# Patient Record
Sex: FEMALE | Race: WHITE | NOT HISPANIC OR LATINO | Employment: FULL TIME | URBAN - METROPOLITAN AREA
[De-identification: names, ages, dates, MRNs, and addresses within clinical notes are randomized per-mention and may not be internally consistent; named-entity substitution may affect disease eponyms.]

---

## 2023-11-27 ENCOUNTER — OFFICE VISIT (OUTPATIENT)
Dept: OBSTETRICS AND GYNECOLOGY | Facility: CLINIC | Age: 63
End: 2023-11-27
Payer: COMMERCIAL

## 2023-11-27 VITALS
WEIGHT: 164 LBS | HEIGHT: 66 IN | DIASTOLIC BLOOD PRESSURE: 76 MMHG | BODY MASS INDEX: 26.36 KG/M2 | SYSTOLIC BLOOD PRESSURE: 127 MMHG

## 2023-11-27 DIAGNOSIS — N76.0 ACUTE VAGINITIS: ICD-10-CM

## 2023-11-27 DIAGNOSIS — Z01.419 ENCOUNTER FOR GYNECOLOGICAL EXAMINATION WITHOUT ABNORMAL FINDING: ICD-10-CM

## 2023-11-27 DIAGNOSIS — M81.0 AGE RELATED OSTEOPOROSIS, UNSPECIFIED PATHOLOGICAL FRACTURE PRESENCE: ICD-10-CM

## 2023-11-27 DIAGNOSIS — Z78.0 MENOPAUSE: Primary | ICD-10-CM

## 2023-11-27 PROCEDURE — 99396 PREV VISIT EST AGE 40-64: CPT | Performed by: OBSTETRICS & GYNECOLOGY

## 2023-11-27 PROCEDURE — 1036F TOBACCO NON-USER: CPT | Performed by: OBSTETRICS & GYNECOLOGY

## 2023-11-27 PROCEDURE — 88175 CYTOPATH C/V AUTO FLUID REDO: CPT

## 2023-11-27 PROCEDURE — 87624 HPV HI-RISK TYP POOLED RSLT: CPT

## 2023-11-27 RX ORDER — SUMATRIPTAN 50 MG/1
TABLET, FILM COATED ORAL
COMMUNITY
Start: 2021-12-07

## 2023-11-27 RX ORDER — GABAPENTIN 300 MG/1
300 CAPSULE ORAL 3 TIMES DAILY
COMMUNITY

## 2023-11-27 RX ORDER — IBUPROFEN 200 MG
TABLET ORAL
COMMUNITY
Start: 2014-09-10

## 2023-11-27 RX ORDER — ALENDRONATE SODIUM 70 MG/1
TABLET ORAL
COMMUNITY
Start: 2021-09-30 | End: 2023-11-27 | Stop reason: SDUPTHER

## 2023-11-27 RX ORDER — CHOLECALCIFEROL (VITAMIN D3) 50 MCG
2000 TABLET ORAL
COMMUNITY
Start: 2015-03-09

## 2023-11-27 RX ORDER — ESTRADIOL 0.1 MG/G
CREAM VAGINAL
COMMUNITY
Start: 2019-02-18

## 2023-11-27 RX ORDER — ALENDRONATE SODIUM 70 MG/1
70 TABLET ORAL
Qty: 12 TABLET | Refills: 3 | Status: SHIPPED | OUTPATIENT
Start: 2023-11-27

## 2023-11-27 RX ORDER — FLUCONAZOLE 150 MG/1
TABLET ORAL
COMMUNITY
End: 2024-02-08

## 2023-11-27 RX ORDER — RIBOFLAVIN (VITAMIN B2) 100 MG
200 TABLET ORAL 2 TIMES DAILY
COMMUNITY
Start: 2020-07-29

## 2023-11-27 RX ORDER — TRAMADOL HYDROCHLORIDE 50 MG/1
50 TABLET ORAL EVERY 8 HOURS PRN
COMMUNITY
Start: 2013-07-21 | End: 2024-02-23

## 2023-11-27 RX ORDER — FLUCONAZOLE 150 MG/1
150 TABLET ORAL ONCE
Qty: 2 TABLET | Refills: 1 | Status: SHIPPED | OUTPATIENT
Start: 2023-11-27 | End: 2023-11-27

## 2023-11-27 NOTE — PROGRESS NOTES
Subjective   Yaneli Rowe is a 63 y.o. female here for a routine exam. Current complaints: She has a history of left breast cancer.  She is current on her imaging with the breast specialist.    She uses estradiol cream for vaginal dryness.  She did does have intermittent itchiness that responds to Diflucan.    There is no postmenopausal bleeding or pelvic pain.  No dysuria, no change in bowel habits or vaginal discharge.    A bone density test in September 2021 showed osteoporosis, T score -2.5.  She is on Fosamax.  Personal health questionnaire reviewed: yes.     Gynecologic History  Patient's last menstrual period was 01/01/2010 (approximate).  Contraception: post menopausal status  Last Pap: 9/1/2020. Results were: normal  Last mammogram: 3/27/23. Results were: normal    Obstetric History  OB History   No obstetric history on file.       Objective   Constitutional: Alert and in no acute distress. Well developed, well nourished.   Head and Face: Head and face: Normal.    Eyes: Normal external exam - nonicteric sclera, extraocular movements intact (EOMI) and no ptosis.   Neck: No neck asymmetry. Supple. Thyroid not enlarged and there were no palpable thyroid nodules.    Pulmonary: No respiratory distress.   Chest: Breasts: Normal appearance, no nipple discharge and no skin changes. Palpation of breasts and axillae: No palpable mass and no axillary lymphadenopathy.   Abdomen: Soft nontender; no abdominal mass palpated. No organomegaly. No hernias.   Genitourinary: External genitalia: Normal. No inguinal lymphadenopathy. Bartholin's Urethral and Skenes Glands: Normal. Urethra: Normal.  Bladder: Normal on palpation. Vagina: Normal. Cervix: Normal.  Uterus: Normal.  Right Adnexa/parametria: Normal.  Left Adnexa/parametria: Normal.  Inspection of Perianal Area: Normal.   Musculoskeletal: No joint swelling seen, normal movements of all extremities.   Skin: Normal skin color and pigmentation, normal skin turgor, and no  rash.   Neurologic: Non-focal. Grossly intact.   Psychiatric: Alert and oriented x 3. Affect normal to patient baseline. Mood: Appropriate.  Physical Exam     Assessment/Plan   Healthy female exam.  This is a 63-year-old female with a normal exam.  A Pap smear was sent.  Her routine mammogram is completed with the breast specialist.    She plans to have her bone density test at the [facility] location.  A refill for Fosamax and Diflucan was ordered to the pharmacy.  I will see her in 1 year.  Education reviewed: self breast exams.

## 2023-12-11 ENCOUNTER — HOSPITAL ENCOUNTER (OUTPATIENT)
Dept: RADIOLOGY | Facility: HOSPITAL | Age: 63
Discharge: HOME | End: 2023-12-11
Payer: COMMERCIAL

## 2023-12-11 DIAGNOSIS — Z78.0 MENOPAUSE: ICD-10-CM

## 2023-12-11 PROCEDURE — 77080 DXA BONE DENSITY AXIAL: CPT | Performed by: STUDENT IN AN ORGANIZED HEALTH CARE EDUCATION/TRAINING PROGRAM

## 2023-12-11 PROCEDURE — 77080 DXA BONE DENSITY AXIAL: CPT

## 2023-12-12 NOTE — RESULT ENCOUNTER NOTE
The bone density test on December 11 shows normal bone density of the spine, and an improvement of the bone density of the left hip.  The T-score is now -2.4.  It appears the Fosamax is increasing your bone density in the left hip from the last study by 6.8%.  I recommend continuing the Fosamax.  Repeat bone density test is in 2 years.

## 2023-12-13 NOTE — RESULT ENCOUNTER NOTE
I left a message on [employer], as she is not active in [employer].  The bone density is improving on Fosamax.  She has osteopenia of the left hip and a normal spine bone density.  I recommend a repeat bone density test in 2 years.

## 2023-12-18 LAB
CYTOLOGY CMNT CVX/VAG CYTO-IMP: NORMAL
HPV HR 12 DNA GENITAL QL NAA+PROBE: NEGATIVE
HPV HR GENOTYPES PNL CVX NAA+PROBE: NEGATIVE
HPV16 DNA SPEC QL NAA+PROBE: NEGATIVE
HPV18 DNA SPEC QL NAA+PROBE: NEGATIVE
LAB AP HPV GENOTYPE QUESTION: YES
LAB AP HPV HR: NORMAL
LABORATORY COMMENT REPORT: NORMAL
LMP START DATE: NORMAL
MENSTRUAL HX REPORTED: NORMAL
PATH REPORT.TOTAL CANCER: NORMAL

## 2024-02-07 DIAGNOSIS — N76.0 ACUTE VAGINITIS: Primary | ICD-10-CM

## 2024-02-08 RX ORDER — FLUCONAZOLE 150 MG/1
TABLET ORAL
Qty: 3 TABLET | Refills: 1 | Status: SHIPPED | OUTPATIENT
Start: 2024-02-08 | End: 2024-02-11

## 2024-03-28 NOTE — PROGRESS NOTES
Yaneli Rowe female   1960 64 y.o.   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 She underwent left magseed partial mastectomy, magseed SLNB with intraoperative radiation and bilateral crescent mastopexy closure on 2019. Final pathology demonstrated three sentinel lymph nodes, negative for malignancy (0/3), left breast no residual carcinoma identified, ADH, ALH, greatest dimension 0.4cm ( from previous biopsy specimen) , grade 1, Negative margins. pT1a N0 MX. She took Tamoxifen 2019-2020 discontinued due to low platelets and unable to tolerate Raloxifene due to increase in migraines. She denies any new masses, skin changes, nipple discharge or pain.       BREAST IMAGING: 3/18/2022 bilateral diagnostic mammogram BI-RADS Category 2. No breast MRIs performed.      REPRODUCTIVE HISTORY: Menarche age 12, , first birth at 31, Menopause age 53, heterogenously breast tissue     FAMILY CANCER HISTORY:   Mother: colon cancer in 70's   Father: melanoma in 80's         REVIEW OF SYSTEMS    Constitutional:  Negative for appetite change, fatigue, fever and unexpected weight change.   HENT:  Negative for ear pain, hearing loss, nosebleeds, sore throat and trouble swallowing.    Eyes:  Negative for discharge, itching and visual disturbance.   Respiratory:  Negative for cough, chest tightness and shortness of breath.    Cardiovascular:  Negative for chest pain, palpitations and leg swelling.   Breast: as indicated in HPI  Gastrointestinal:  Negative for abdominal pain, constipation, diarrhea and nausea.   Endocrine: Negative for cold intolerance and heat intolerance.    Genitourinary:  Negative for dysuria, frequency, hematuria, pelvic pain and vaginal bleeding.   Musculoskeletal:  Negative for arthralgias, back pain, gait problem, joint swelling and myalgias.   Skin:  Negative for color change and rash.   Allergic/Immunologic: Negative for environmental allergies and food allergies.   Neurological:  Negative for dizziness, tremors, speech difficulty, weakness, numbness and headaches.   Hematological:  Does not bruise/bleed easily.   Psychiatric/Behavioral:  Negative for agitation, dysphoric mood and sleep disturbance. The patient is not nervous/anxious.         MEDICATIONS  Current Outpatient Medications   Medication Instructions    alendronate (FOSAMAX) 70 mg, oral, Every 7 days, Take in the morning with a full glass of water, on an empty stomach, and do not take anything else by mouth or lie down for the next 30 min.    cholecalciferol (VITAMIN D-3) 2,000 Units, oral, Daily RT    estradiol (Estrace) 0.01 % (0.1 mg/gram) vaginal cream     gabapentin (NEURONTIN) 300 mg, oral, 3 times daily    ibuprofen 200 mg tablet oral    L. acidophilus/Bifid. animalis 31 billion cell capsule 1 capsule, oral, Daily RT    magnesium glycinate 600 mg, oral    riboflavin (VITAMIN B2) 200 mg, oral, 2 times daily    SUMAtriptan (Imitrex) 50 mg tablet take 1 tablet by mouth if needed AT ONSET OF HEADACHE may repeat in 2 hours IF headache PERSISTS    traMADol (Ultram) 50 mg tablet PLEASE SEE ATTACHED FOR DETAILED DIRECTIONS        ALLERGIES  Allergies   Allergen Reactions    Penicillins Swelling    Nickel Dermatitis, Itching and Rash        SOCIAL HISTORY    No family history on file.      Social History     Tobacco Use    Smoking status: Never    Smokeless tobacco: Never   Substance Use Topics    Alcohol use: Not on file        VITALS  Vitals:    03/29/24 1034   BP: 127/80   Pulse: 71        PHYSICAL EXAM  Patient is alert and oriented x3, with appropriate mood. Her gait is steady and hand grasps are  equal. Sclera clear. Breasts are slightly asymmetrical. The breasts with bilateral healed circumareolar incision, left breast gray staining around nipple areolar complex from MagTrace injection, left healed axillary incision. The tissue of both breasts is soft without palpable abnormalities, discrete nodules or masses. She has increased density in the central superior portion of her left breast. The nipples appear normal. There is no cervical, supraclavicular, or axillary lymphadenopathy palpable. Heart rate and rhythm normal, S1 and S2 appreciated. The lungs are clear bilaterally. Abdomen is soft & non-tender.       Physical Exam  Chest:              IMAGING    BI mammo bilateral diagnostic tomosynthesis 03/29/2024    Narrative  Interpreted By:  Yuki Blanca,  STUDY:  BI MAMMO BILATERAL DIAGNOSTIC TOMOSYNTHESIS;  3/29/2024 10:25 am    ACCESSION NUMBER(S):  [id number]    ORDERING CLINICIAN:  GINA WHITESIDE    INDICATION:  Left lumpectomy with radiation.    COMPARISON:  03/27/2023 and 03/18/2022.    FINDINGS:  2D and tomosynthesis images were reviewed at 1 mm slice thickness.    Density:  The breast tissue is heterogeneously dense, which may  obscure small masses.    Stable postsurgical changes are again seen in the slightly medial  superior left breast at posterior depth. No suspicious masses or  calcifications are identified in either breast.    Impression  No mammographic evidence of malignancy.    BI-RADS Category:  2 Benign.  Recommendation:  Annual Screening.  Recommended Date:  1 Year.  Laterality:  Bilateral.      Time was spent viewing digital images of the radiology testing with the patient. I explained the results in depth, along with suggested explanation for follow up recommendations based on the testing results.          ORDERS  Orders Placed This Encounter   Procedures    BI mammo bilateral screening tomosynthesis     Standing Status:   Future     Standing Expiration Date:   5/28/2025     Order  Specific Question:   Perform a breast ultrasound if clinically indicated by Radiologist?     Answer:   Yes     Order Specific Question:   Previous Mamm performed at  location?     Answer:   Yes     Order Specific Question:   Reason for exam:     Answer:   .     Order Specific Question:   Radiologist to Determine Optimal Study     Answer:   Yes     Order Specific Question:   Release result to [facility]     Answer:   Immediate     Order Specific Question:   Is this exam part of a Research Study? If Yes, link this order to the research study     Answer:   No           ASSESSMENT/PLAN  1. Healthcare maintenance  BI mammo bilateral screening tomosynthesis      2. Encounter for follow-up surveillance of breast cancer             Follow up Discharge to PCP.  Thank you for coming to see me today at [facility]. Your clinical examination and imaging is normal. You no longer need to be seen by a surgical breast specialist. It is important to continue annual screening mammograms & clinical breast exams. Please return to see me if you have a new breast problem or abnormal mammogram. It has been a pleasure having you as a patient.     You can see your health information, review clinical summaries from office visits & test results online when you follow your health with MY  Chart, a personal health record. To sign up go to [email or web address]. If you need assistance with signing up or trouble getting into your account call [facility] Patient Line 24/7 at [phone].     My office phone number is [phone] if you need to get in touch with me or have additional questions or problems. Thank you for choosing [facility] and trusting me as your healthcare provider. I am honored to be a provider on your health care team and I remain dedicated to helping you achieve your health goals.       Serene Jamison, ABELINO-CNP  [facility]

## 2024-03-29 ENCOUNTER — HOSPITAL ENCOUNTER (OUTPATIENT)
Dept: RADIOLOGY | Facility: CLINIC | Age: 64
Discharge: HOME | End: 2024-03-29
Payer: COMMERCIAL

## 2024-03-29 ENCOUNTER — OFFICE VISIT (OUTPATIENT)
Dept: SURGICAL ONCOLOGY | Facility: CLINIC | Age: 64
End: 2024-03-29
Payer: COMMERCIAL

## 2024-03-29 VITALS — HEIGHT: 66 IN | BODY MASS INDEX: 26.36 KG/M2 | WEIGHT: 164.02 LBS

## 2024-03-29 VITALS
HEIGHT: 66 IN | DIASTOLIC BLOOD PRESSURE: 80 MMHG | BODY MASS INDEX: 26.78 KG/M2 | SYSTOLIC BLOOD PRESSURE: 127 MMHG | WEIGHT: 166.6 LBS | HEART RATE: 71 BPM

## 2024-03-29 DIAGNOSIS — Z08 ENCOUNTER FOR FOLLOW-UP SURVEILLANCE OF BREAST CANCER: ICD-10-CM

## 2024-03-29 DIAGNOSIS — Z85.3 PERSONAL HISTORY OF MALIGNANT NEOPLASM OF BREAST: ICD-10-CM

## 2024-03-29 DIAGNOSIS — Z85.3 ENCOUNTER FOR FOLLOW-UP SURVEILLANCE OF BREAST CANCER: ICD-10-CM

## 2024-03-29 DIAGNOSIS — Z08 ENCOUNTER FOR FOLLOW-UP EXAMINATION AFTER COMPLETED TREATMENT FOR MALIGNANT NEOPLASM: ICD-10-CM

## 2024-03-29 DIAGNOSIS — Z00.00 HEALTHCARE MAINTENANCE: Primary | ICD-10-CM

## 2024-03-29 PROCEDURE — 1036F TOBACCO NON-USER: CPT | Performed by: NURSE PRACTITIONER

## 2024-03-29 PROCEDURE — 77066 DX MAMMO INCL CAD BI: CPT | Performed by: RADIOLOGY

## 2024-03-29 PROCEDURE — 99214 OFFICE O/P EST MOD 30 MIN: CPT | Performed by: NURSE PRACTITIONER

## 2024-03-29 PROCEDURE — 77062 BREAST TOMOSYNTHESIS BI: CPT | Performed by: RADIOLOGY

## 2024-03-29 PROCEDURE — 77062 BREAST TOMOSYNTHESIS BI: CPT

## 2024-03-29 RX ORDER — TRAMADOL HYDROCHLORIDE 50 MG/1
TABLET ORAL
COMMUNITY

## 2024-03-29 ASSESSMENT — PAIN SCALES - GENERAL: PAINLEVEL: 0-NO PAIN
